# Patient Record
(demographics unavailable — no encounter records)

---

## 2018-05-17 NOTE — PCM.LDHP
L&D History of Present Illness





- General


Date of Service: 18


Admit Problem/Dx: 


 Patient Status Order with Admit Dx/Problem





18 00:34


Patient Status [ADT] Routine 








 Admission Diagnosis/Problem











Admission Diagnosis/Problem    Pregnancy-related examination














18 08:26


38 yo  EDC 2018 40 5/7wks A+, R-NI, GBS pos with PCN allergy. IOL for 

post dates


Source of Information: Patient


History Limitations: Reports: No Limitations





- History of Present Illness


Improves with: Reports: None


Worsens with: Reports: None


Associated Symptoms: Reports: N





- Related Data


Allergies/Adverse Reactions: 


 Allergies











Allergy/AdvReac Type Severity Reaction Status Date / Time


 


amoxicillin trihydrate Allergy  rashes Verified 16 15:39





[From Augmentin]     














Past Medical History





- Past Health History


Medical/Surgical History: Denies Medical/Surgical History





Social & Family History





- Family History


Family Medical History: Noncontributory





- Tobacco Use


Smoking Status *Q: Never Smoker


Second Hand Smoke Exposure: No





- Caffeine Use


Caffeine Use: Reports: Soda





- Recreational Drug Use


Recreational Drug Use: No





H&P Review of Systems





- Review of Systems:


Review Of Systems: See Below


General: Reports: No Symptoms


HEENT: Reports: No Symptoms


Pulmonary: Reports: No Symptoms


Cardiovascular: Reports: No Symptoms


Gastrointestinal: Reports: No Symptoms


Genitourinary: Reports: No Symptoms


Musculoskeletal: Reports: No Symptoms


Skin: Reports: No Symptoms


Psychiatric: Reports: No Symptoms


Neurological: Reports: No Symptoms


Hematologic/Lymphatic: Reports: No Symptoms


Immunologic: Reports: No Symptoms





L&D Exam





- Exam


Exam: See Below





- Vital Signs


Weight: 74.389 kg





- OB Specific


Fetal Heart Tones: Present


Fetal Heart Rate (FHR) Variability: Moderate (6-25 bmp)





- Pittman Score


Pittman Score Cervix Position: Posterior


Pittman Score Consistency: Soft


Pittman Score Effacement: 51-70%


Pittman Score Dilation: 1-2 cm


Pittman Score Infant's Station: -1 ,0


Pittman Score Total: 7





- Exam


General: Alert, Oriented, Cooperative


HEENT: Hearing Intact


Lungs: Normal Respiratory Effort


Rectal Exam: Deferred


Genitourinary: Cervical dilitation


Back Exam: Normal Inspection, Full Range of Motion


Extremities: Normal Inspection, Normal Range of Motion, Non-Tender, No Pedal 

Edema, Normal Capillary Refill


Skin: Warm, Dry, Intact


Neurological: Cranial Nerves Intact, Reflexes Equal Bilateral, Normal Gait, 

Normal Speech, Normal Tone


Psychiatric: Alert, Normal Affect, Normal Mood





- Patient Data


Lab Results Last 24 hrs: 


 Laboratory Results - last 24 hr











  18 Range/Units





  01:20 01:20 


 


WBC  9.33   (4.0-11.0)  K/uL


 


RBC  3.96 L   (4.30-5.90)  M/uL


 


Hgb  13.3   (12.0-16.0)  g/dL


 


Hct  37.6   (36.0-46.0)  %


 


MCV  94.9   (80.0-98.0)  fL


 


MCH  33.6 H   (27.0-32.0)  pg


 


MCHC  35.4   (31.0-37.0)  g/dL


 


RDW Std Deviation  41.2   (28.0-62.0)  fl


 


RDW Coeff of Morena  12   (11.0-15.0)  %


 


Plt Count  288   (150-400)  K/uL


 


MPV  11.00   (7.40-12.00)  fL


 


Blood Type   A POSITIVE  


 


Antibody Screen   NEGATIVE  











Result Diagrams: 


 18 01:20








- Problem List


(1) Supervision of normal IUP (intrauterine pregnancy) in primigravida


SNOMED Code(s): 16991794, 335846449, 693627002, 858683050


   ICD Code: Z34.00 - ENCNTR FOR SUPRVSN OF NORMAL FIRST PREGNANCY, UNSP 

TRIMESTER   Status: Acute   Priority: High   Current Visit: Yes   


Qualifiers: 


   Trimester: third trimester   Qualified Code(s): Z34.03 - Encounter for 

supervision of normal first pregnancy, third trimester   





(2) AMA (advanced maternal age) primigravida 35+


SNOMED Code(s): 55941128, 622180997


   ICD Code: O09.519 - SUPERVISION OF ELDERLY PRIMIGRAVIDA, UNSPECIFIED 

TRIMESTER   Status: Acute   Priority: High   Current Visit: Yes   


Qualifiers: 


   Trimester: third trimester   Qualified Code(s): O09.513 - Supervision of 

elderly primigravida, third trimester   


Problem List Initiated/Reviewed/Updated: Yes


Orders Last 24hrs: 


 Active Orders 24 hr











 Category Date Time Status


 


 Patient Status [ADT] Routine ADT  18 00:34 Active


 


 Bedrest Bathroom Privileges [RC] ASDIRECTED Care  18 00:34 Active


 


 Communication Order [RC] ASDIRECTED Care  18 00:34 Active


 


 Communication Order [RC] ASDIRECTED Care  18 00:34 Active


 


 Communication Order [RC] ASDIRECTED Care  18 00:34 Active


 


 Fetal Heart Tones [RC] CONTINUOUS Care  18 00:34 Active


 


 Fetal Non Stress Test [RC] PER UNIT ROUTINE Care  18 00:34 Active


 


 May Shower [RC] ASDIRECTED Care  18 00:34 Active


 


 Notify Provider [RC] PRN Care  18 00:34 Active


 


 Notify Provider [RC] PRN Care  18 00:34 Active


 


 Notify Provider [RC] PRN Care  18 00:34 Active


 


 Notify Provider [RC] STAT Care  18 00:34 Active


 


 Oxygen Therapy [RC] ASDIRECTED Care  18 00:34 Active


 


 Up ad Nikki [RC] ASDIRECTED Care  18 00:34 Active


 


 Vaginal Exam [RC] PRN Care  18 00:34 Active


 


 Vaginal Exam [RC] PRN Care  18 00:34 Active


 


 Vital Signs [RC] PER UNIT ROUTINE Care  18 00:34 Active


 


 Vital Signs [RC] PER UNIT ROUTINE Care  18 00:34 Active


 


 Regular Diet [DIET] Diet  18 Breakfast Active


 


 Carboprost Tromethamine [Hemabate DS] Med  18 00:34 Active





 250 mcg IM ASDIRECTED PRN   


 


 Lactated Ringers [Ringers, Lactated] 1,000 ml Med  18 00:45 Active





 IV ASDIRECTED   


 


 Lidocaine 1% [Xylocaine 1%] Med  18 00:34 Active





 50 ml INJECT .ONCE PRN   


 


 Methylergonovine [Methergine] Med  18 00:34 Active





 0.2 mg IM ASDIRECTED PRN   


 


 Misoprostol [Cytotec] Med  18 00:45 Active





 25 mcg PO .ONCE   


 


 Misoprostol [Cytotec] Med  18 00:34 Active





 25 mcg PO Q4H PRN   


 


 Misoprostol [Cytotec] Med  18 03:48 Active





 25 mcg VAG Q4H PRN   


 


 Nalbuphine [Nubain] Med  18 00:45 Active





 10 mg IVPUSH ASDIRECTED   


 


 Oxytocin/0.9 % Sodium Chloride [Oxytocin 30 Unit/500 ML Med  18 00:45 

Active





 -NS]   





 30 unit in 500 ml IV TITRATE   


 


 Oxytocin/0.9 % Sodium Chloride [Oxytocin 30 Unit/500 ML Med  18 00:45 

Active





 -NS]   





 30 unit in 500 ml IV TITRATE   


 


 Sodium Chloride 0.9% [Saline Flush] Med  18 00:34 Active





 10 ml FLUSH ASDIRECTED PRN   


 


 Sodium Chloride 0.9% [Saline Flush] Med  18 00:34 Active





 2.5 ml FLUSH ASDIRECTED PRN   


 


 Terbutaline [Brethine] Med  18 00:34 Active





 0.25 mg SUBCUT ASDIRECTED PRN   


 


 Tranexamic Acid [Cyklokapron] 1,000 mg Med  18 00:34 Active





 Sodium Chloride 0.9% [Normal Saline] 100 ml   





 IV ONETIME   


 


 Water For Irrigation,Sterile [Sterile Water for Med  18 00:34 Active





 Irrigation]   





 1,000 ml IRR ASDIRECTED PRN   


 


 Fetal Scalp Electrode [WOMSER] Per Unit Routine Oth  18 00:34 Ordered


 


 Medication Administration Instruction [OM.PC] Q3H Oth  18 00:45 Ordered


 


 Peripheral IV Insertion Adult [OM.PC] Routine Oth  18 00:34 Ordered


 


 Resuscitation Status Routine Resus Stat  18 00:34 Ordered








 Medication Orders





Carboprost Tromethamine (Hemabate Ds)  250 mcg IM ASDIRECTED PRN


   PRN Reason: Post Partum Hemorrhage


Lactated Ringer's (Ringers, Lactated)  1,000 mls @ 150 mls/hr IV ASDIRECTED SIA


Oxytocin/Sodium Chloride (Oxytocin 30 Unit/500 Ml-Ns)  30 unit in 500 mls @ 2 

mls/hr IV TITRATE SIA; Protocol


Oxytocin/Sodium Chloride (Oxytocin 30 Unit/500 Ml-Ns)  30 unit in 500 mls @ 999 

mls/hr IV TITRATE SIA


Tranexamic Acid 1,000 mg/ (Sodium Chloride)  110 mls @ 660 mls/hr IV ONETIME PRN


   PRN Reason: Bleeding


Lidocaine HCl (Xylocaine 1%)  50 ml INJECT .ONCE PRN


   PRN Reason: Laceration repair


Methylergonovine Maleate (Methergine)  0.2 mg IM ASDIRECTED PRN


   PRN Reason: Post Partum Hemorrhage


Misoprostol (Cytotec)  25 mcg PO .ONCE SIA


   Last Admin: 18 02:35  Dose: 25 mcg


Misoprostol (Cytotec)  25 mcg PO Q4H PRN


   PRN Reason: Cervical Ripening


   Last Admin: 18 06:52  Dose: 25 mcg


Misoprostol (Cytotec)  25 mcg VAG Q4H PRN


   PRN Reason: cervical ripening


   Last Admin: 18 06:52  Dose: 25 mcg


Nalbuphine HCl (Nubain)  10 mg IVPUSH ASDIRECTED SIA


   Stop: 18 00:46


Sodium Chloride (Saline Flush)  10 ml FLUSH ASDIRECTED PRN


   PRN Reason: Keep Vein Open


Sodium Chloride (Saline Flush)  2.5 ml FLUSH ASDIRECTED PRN


   PRN Reason: Keep Vein Open


Sterile Water (Sterile Water For Irrigation)  1,000 ml IRR ASDIRECTED PRN


   PRN Reason: delivery


Terbutaline Sulfate (Brethine)  0.25 mg SUBCUT ASDIRECTED PRN


   PRN Reason: Tacysystole








Assessment/Plan Comment:: 





IOL


A:38 yo  EDC 2018 40 5/7wks A+, R-NI, GBS pos with PCN allergy. IOL 

for post dates


P: Admit, Cytotec to pitocin, epidrual prn, anticipate . Dr Jiang updated.

## 2018-05-17 NOTE — PCM.DEL
L & D Note





- General Info


Date of Service: 18


Mother's Due Date: 18





- Delivery Note


Cervical Ripening Method: Misoprostil


Delivery Outcome: Livebirth


Infant Delivery Method: Spontaneous Vaginal Delivery-Single


Infant Delivery Mode: Spontaneous


Birth Presentation: Vertex


Nuchal Cord: None


Anesthesia Type: Epidural


Anesthetic: Lidocaine (Xylocaine) 1% Plain


Amniotic Fluid Description: Clear


Episiotomy Type: Midline


Laceration: 2nd Degree


Suture type: Vicryl


Suture size: 3-0


Placenta: Intact, Spontaneous


Cord: 3 Vessels


Estimated Blood Loss: 150


Resuscitation Needed: No


APGAR Score 1 min: 8


APGAR Score 5 min: 9


Second Stage Interventions: Reports: Pushing, Pulls Own Legs Back


Delivery Comments (Free Text/Narrative):: 





 of viable girl. Head delivered with good pushing, shoulders and body 

followed easily. Infant to mothers abdomen. Spont cry, RN at  for support. 

Delayed cord clamping. Pitocin to IVF. Cord clamped and cut by FOB. Cord blood 

collected. Placenta delivered grossly intact. Inspection noted 2nd deg alyssa lac 

that was repaired in the usual manor with a 3-0 noy. EBL 150cc, APGARS 8/9 Wt 

6lb 8oz. Mother and baby left in stable for recovery bonding well.





Induction Criteria





- Pittman Score


Pittman Score Dilation: 1-2 cm


Pittman Score Effacement: 60-70%


Pittman Score Infant's Station: -1 ,0


Pittman Score Consistency: Soft


Pittman Score Cervix Position: Posterior


Pittman Score Total: 7


Pittman Score Presenting Part: Reports: Cephalic





- Induction


Gestational Age >/= 39 wks: Yes


Medical Indication: 





post dates


Estimated Pelvis: Reports: Adequate


Reassuring Fetal Monitoring Strip: Yes


Absence of Tachy Systole: Yes





- General Info


Date of Service: 18


Admission Dx/Problem (Free Text): 


 Patient Status Order with Admit Dx/Problem





18 00:34


Patient Status [ADT] Routine 








 Admission Diagnosis/Problem











Admission Diagnosis/Problem    Pregnancy-related examination














18 08:26


36 yo  EDC 2018 40 5/7wks A+, R-NI, GBS pos with PCN allergy. IOL for 

post dates


Functional Status: Reports: Pain Controlled





- Review of Systems


General: Reports: No Symptoms


HEENT: Reports: No Symptoms


Pulmonary: Reports: No Symptoms


Cardiovascular: Reports: No Symptoms


Gastrointestinal: Reports: No Symptoms


Genitourinary: Reports: No Symptoms


Musculoskeletal: Reports: No Symptoms


Skin: Reports: No Symptoms


Neurological: Reports: No Symptoms


Psychiatric: Reports: No Symptoms





- Patient Data


Weight - Most Recent: 74.389 kg


Lab Results Last 24 Hours: 


 Laboratory Results - last 24 hr











  18 Range/Units





  01:20 01:20 


 


WBC  9.33   (4.0-11.0)  K/uL


 


RBC  3.96 L   (4.30-5.90)  M/uL


 


Hgb  13.3   (12.0-16.0)  g/dL


 


Hct  37.6   (36.0-46.0)  %


 


MCV  94.9   (80.0-98.0)  fL


 


MCH  33.6 H   (27.0-32.0)  pg


 


MCHC  35.4   (31.0-37.0)  g/dL


 


RDW Std Deviation  41.2   (28.0-62.0)  fl


 


RDW Coeff of Morena  12   (11.0-15.0)  %


 


Plt Count  288   (150-400)  K/uL


 


MPV  11.00   (7.40-12.00)  fL


 


Blood Type   A POSITIVE  


 


Antibody Screen   NEGATIVE  











Med Orders - Current: 


 Current Medications





Acetaminophen (Tylenol Extra Strength)  500 mg PO Q4H PRN


   PRN Reason: Pain


Acetaminophen (Tylenol Extra Strength)  1,000 mg PO Q4H PRN


   PRN Reason: Pain


Benzocaine/Menthol (Dermoplast Pain Relief 20%-0.5% Spray)  78 gm TOP 

ASDIRECTED PRN


   PRN Reason: Perineal Comfort Measure


Bisacodyl (Dulcolax)  10 mg RECTAL .ONCE PRN


   PRN Reason: Constipation





Discontinued Medications





Carboprost Tromethamine (Hemabate Ds)  250 mcg IM ASDIRECTED PRN


   PRN Reason: Post Partum Hemorrhage


Lactated Ringer's (Ringers, Lactated)  1,000 mls @ 150 mls/hr IV ASDIRECTED SIA


   Last Admin: 18 11:00 Dose:  150 mls/hr


Oxytocin/Sodium Chloride (Oxytocin 30 Unit/500 Ml-Ns)  30 unit in 500 mls @ 2 

mls/hr IV TITRATE SIA; Protocol


Oxytocin/Sodium Chloride (Oxytocin 30 Unit/500 Ml-Ns)  30 unit in 500 mls @ 999 

mls/hr IV TITRATE [facility]


   Last Admin: 18 13:26 Dose:  999 mls/hr


Tranexamic Acid 1,000 mg/ (Sodium Chloride)  110 mls @ 660 mls/hr IV ONETIME PRN


   PRN Reason: Bleeding


Clindamycin Phosphate 900 mg/ (Premix)  50 mls @ 100 mls/hr IV Q8H [facility]


   Last Admin: 18 11:18 Dose:  100 mls/hr


Fentanyl/Bupivacaine HCl (Fentanyl-Bupiv-Ns 2 Mcg/Ml-0.125%) Confirm 

Administered Dose 100 mls @ as directed EP .STK-MED ONE


   Stop: 18 11:35


Lidocaine HCl (Xylocaine 1%)  50 ml INJECT .ONCE PRN


   PRN Reason: Laceration repair


Methylergonovine Maleate (Methergine)  0.2 mg IM ASDIRECTED PRN


   PRN Reason: Post Partum Hemorrhage


Misoprostol (Cytotec)  25 mcg PO .ONCE SIA


   Last Admin: 18 02:35 Dose:  25 mcg


Misoprostol (Cytotec)  25 mcg PO Q4H PRN


   PRN Reason: Cervical Ripening


   Last Admin: 18 06:52 Dose:  25 mcg


Misoprostol (Cytotec)  25 mcg VAG ONETIME ONE


   Stop: 18 00:42


   Last Admin: 18 02:35 Dose:  25 mcg


Misoprostol (Cytotec)  25 mcg VAG Q4H PRN


   PRN Reason: cervical ripening


   Last Admin: 18 06:52 Dose:  25 mcg


Nalbuphine HCl (Nubain)  10 mg IVPUSH ASDIRECTED [facility]


   Stop: 18 00:46


Sodium Chloride (Saline Flush)  10 ml FLUSH ASDIRECTED PRN


   PRN Reason: Keep Vein Open


Sodium Chloride (Saline Flush)  2.5 ml FLUSH ASDIRECTED PRN


   PRN Reason: Keep Vein Open


Sterile Water (Sterile Water For Irrigation)  1,000 ml IRR ASDIRECTED PRN


   PRN Reason: delivery


Terbutaline Sulfate (Brethine)  0.25 mg SUBCUT ASDIRECTED PRN


   PRN Reason: Tacysystole











- Exam


General: Alert, Oriented, Cooperative, No Acute Distress


Lungs: Normal Respiratory Effort


GI/Abdominal Exam: Soft, Non-Tender


 (Female) Exam: Normal External Exam, Normal Bimanual Exam, Vaginal Bleeding


Back Exam: Normal Inspection, Full Range of Motion


Extremities: Normal Inspection, Normal Range of Motion, Non-Tender, No Pedal 

Edema, Normal Capillary Refill


Skin: Warm, Dry, Intact


Wound/Incisions: Healing Well


Neurological: No New Focal Deficit, Normal Speech, Normal Tone


Psy/Mental Status: Alert, Normal Affect, Normal Mood





- Problem List & Annotations


(1) Supervision of normal IUP (intrauterine pregnancy) in primigravida


SNOMED Code(s): 41719199, 666563479, 762680051, 585819463


   Code(s): Z34.00 - ENCNTR FOR SUPRVSN OF NORMAL FIRST PREGNANCY, UNSP 

TRIMESTER   Status: Acute   Priority: High   Current Visit: Yes   


Qualifiers: 


   Trimester: third trimester   Qualified Code(s): Z34.03 - Encounter for 

supervision of normal first pregnancy, third trimester   





(2) AMA (advanced maternal age) primigravida 35+


SNOMED Code(s): 54489054, 402868332


   Code(s): O09.519 - SUPERVISION OF ELDERLY PRIMIGRAVIDA, UNSPECIFIED 

TRIMESTER   Status: Acute   Priority: High   Current Visit: Yes   


Qualifiers: 


   Trimester: third trimester   Qualified Code(s): O09.513 - Supervision of 

elderly primigravida, third trimester   





(3)  (normal spontaneous vaginal delivery)


SNOMED Code(s): 19274201


   Code(s): O80 - ENCOUNTER FOR FULL-TERM UNCOMPLICATED DELIVERY   Status: 

Acute   Priority: High   Current Visit: Yes   





- Problem List Review


Problem List Initiated/Reviewed/Updated: Yes





- My Orders


Last 24 Hours: 


My Active Orders





/ 00:34


Bedrest Bathroom Privileges [RC] ASDIRECTED 


Communication Order [RC] ASDIRECTED 


Fetal Heart Tones [RC] CONTINUOUS 


Fetal Non Stress Test [RC] PER UNIT ROUTINE 


May Shower [RC] ASDIRECTED 


Notify Provider [RC] PRN 


Notify Provider [RC] STAT 


Oxygen Therapy [RC] ASDIRECTED 


Up ad Nikki [RC] ASDIRECTED 


Vaginal Exam [RC] PRN 


Vaginal Exam [RC] PRN 


Vital Signs [RC] PER UNIT ROUTINE 


Vital Signs [RC] PER UNIT ROUTINE 





18 14:09


Patient Status [ADT] Routine 


May Shower [RC] ASDIRECTED 


Up ad Nikki [RC] ASDIRECTED 


Vital Signs [RC] PER UNIT ROUTINE 


Acetaminophen [Tylenol Extra Strength]   1,000 mg PO Q4H PRN 


Acetaminophen [Tylenol Extra Strength]   500 mg PO Q4H PRN 


Benzocaine/Menthol [Dermoplast Pain Relief 20%-0.5% Spray]   78 gm TOP 

ASDIRECTED PRN 


Bisacodyl [Dulcolax]   10 mg RECTAL .ONCE PRN 


Docusate Sodium [Colace]   100 mg PO BID PRN 


Ibuprofen [Motrin]   400 mg PO Q4H PRN 


Ibuprofen [Motrin]   800 mg PO Q6H PRN 


Lanolin [Lansinoh HPA]   See Dose Instructions  TOP ASDIRECTED PRN 


Witch Hazel [Tucks]   1 pad TOP ASDIRECTED PRN 


oxyCODONE   5 mg PO Q2H PRN 


Assess Lochia [WOMSER] Per Unit Routine 


Assess Uterine Involution [WOMSER] Per Unit Routine 


Peripheral IV Discontinue [OM.PC] Routine 


Resuscitation Status Routine 





18 Lunch


Regular Diet [DIET] 














- Plan


Plan:: 





IOL


A:36 yo  EDC 2018 40 5/7wks A+, R-NI, GBS pos with PCN allergy. IOL 

for post dates


P: Admit, Cytotec to pitocin, epidrual prn, anticipate . Dr Jiang updated. 








A:  of viable female. APGARS 8/9 WT: 6lb 8oz, EBL 150cc, 2nd deg lac with 

repair. Mother and baby left in stable condition for recovery


P: Routine pp plan of care

## 2018-05-17 NOTE — PCM.PREANE
Preanesthetic Assessment





- Anesthesia/Transfusion/Family Hx


Anesthesia History: No Prior Anesthesia


Family History of Anesthesia Reaction: No


Transfusion History: No Prior Transfusion(s)





- Review of Systems


General: No Symptoms


Pulmonary: No Symptoms


Cardiovascular: No Symptoms


Gastrointestinal: No Symptoms


Neurological: No Symptoms


Other: Reports: None





- Physical Assessment


Height: 5 ft 4 in


Weight: 74.389 kg


ASA Class: 2


Mental Status: Alert & Oriented x3


Airway Class: Mallampati = 2


Dentition: Reports: Normal Dentition


Thyro-Mental Finger Breadths: 3


Mouth Opening Finger Breadths: 3


ROM/Head Extension: Full


Lungs: Clear to Auscultation, Normal Respiratory Effort


Cardiovascular: Regular Rate, Regular Rhythm





- Lab


Values: 





 Laboratory Last Values











WBC  9.33 K/uL (4.0-11.0)   18  01:20    


 


RBC  3.96 M/uL (4.30-5.90)  L  18  01:20    


 


Hgb  13.3 g/dL (12.0-16.0)   18  01:20    


 


Hct  37.6 % (36.0-46.0)   18  01:20    


 


MCV  94.9 fL (80.0-98.0)   18  01:20    


 


MCH  33.6 pg (27.0-32.0)  H  18  01:20    


 


MCHC  35.4 g/dL (31.0-37.0)   18  01:20    


 


RDW Std Deviation  41.2 fl (28.0-62.0)   18  01:20    


 


RDW Coeff of Morena  12 % (11.0-15.0)   18  01:20    


 


Plt Count  288 K/uL (150-400)   18  01:20    


 


MPV  11.00 fL (7.40-12.00)   18  01:20    


 


Blood Type  A POSITIVE   18  01:20    


 


Antibody Screen  NEGATIVE   18  01:20    














- Allergies


Allergies/Adverse Reactions: 


 Allergies











Allergy/AdvReac Type Severity Reaction Status Date / Time


 


amoxicillin trihydrate Allergy  rashes Verified 16 15:39





[From Augmentin]     














- Acknowledgements


Anesthesia Type Planned: Epidural


Pt an Appropriate Candidate for the Planned Anesthesia: Yes


Alternatives and Risks of Anesthesia Discussed w Pt/Guardian: Yes


Pt/Guardian Understands and Agrees with Anesthesia Plan: Yes





PreAnesthesia Questionnaire





- Past Health History


Medical/Surgical History: Denies Medical/Surgical History


HEENT History: Reports: None


Cardiovascular History: Reports: None


Respiratory History: Reports: None


Gastrointestinal History: Reports: GERD


Genitourinary History: Reports: None


OB/GYN History: Reports: Pregnancy


: 1


Para: 0


LMP (Approximate): Pregnant


Musculoskeletal History: Reports: None


Neurological History: Reports: None


Psychiatric History: Reports: None


Endocrine/Metabolic History: Reports: None


Hematologic History: Reports: None


Immunologic History: Reports: None


Oncologic (Cancer) History: Reports: None


Dermatologic History: Reports: None





- Infectious Disease History


Infectious Disease History: Reports: None





- SUBSTANCE USE


Smoking Status *Q: Never Smoker


Tobacco Use Within Last Twelve Months: No


Second Hand Smoke Exposure: No


Recreational Drug Use History: No





- CURRENT (IN HOUSE) MEDS


Current Meds: 





 Current Medications





Carboprost Tromethamine (Hemabate Ds)  250 mcg IM ASDIRECTED PRN


   PRN Reason: Post Partum Hemorrhage


Lactated Ringer's (Ringers, Lactated)  1,000 mls @ 150 mls/hr IV ASDIRECTED SIA


   Last Admin: 18 11:00 Dose:  150 mls/hr


Oxytocin/Sodium Chloride (Oxytocin 30 Unit/500 Ml-Ns)  30 unit in 500 mls @ 2 

mls/hr IV TITRATE SIA; Protocol


Oxytocin/Sodium Chloride (Oxytocin 30 Unit/500 Ml-Ns)  30 unit in 500 mls @ 999 

mls/hr IV TITRATE SIA


Tranexamic Acid 1,000 mg/ (Sodium Chloride)  110 mls @ 660 mls/hr IV ONETIME PRN


   PRN Reason: Bleeding


Clindamycin Phosphate 900 mg/ (Premix)  50 mls @ 100 mls/hr IV Q8H [facility]


   Last Admin: 18 11:18 Dose:  100 mls/hr


Lidocaine HCl (Xylocaine 1%)  50 ml INJECT .ONCE PRN


   PRN Reason: Laceration repair


Methylergonovine Maleate (Methergine)  0.2 mg IM ASDIRECTED PRN


   PRN Reason: Post Partum Hemorrhage


Misoprostol (Cytotec)  25 mcg PO .ONCE SIA


   Last Admin: 18 02:35 Dose:  25 mcg


Misoprostol (Cytotec)  25 mcg PO Q4H PRN


   PRN Reason: Cervical Ripening


   Last Admin: 18 06:52 Dose:  25 mcg


Misoprostol (Cytotec)  25 mcg VAG Q4H PRN


   PRN Reason: cervical ripening


   Last Admin: 18 06:52 Dose:  25 mcg


Nalbuphine HCl (Nubain)  10 mg IVPUSH ASDIRECTED SIA


   Stop: 18 00:46


Sodium Chloride (Saline Flush)  10 ml FLUSH ASDIRECTED PRN


   PRN Reason: Keep Vein Open


Sodium Chloride (Saline Flush)  2.5 ml FLUSH ASDIRECTED PRN


   PRN Reason: Keep Vein Open


Sterile Water (Sterile Water For Irrigation)  1,000 ml IRR ASDIRECTED PRN


   PRN Reason: delivery


Terbutaline Sulfate (Brethine)  0.25 mg SUBCUT ASDIRECTED PRN


   PRN Reason: Tacysystole





Discontinued Medications





Fentanyl/Bupivacaine HCl (Fentanyl-Bupiv-Ns 2 Mcg/Ml-0.125%) Confirm 

Administered Dose 100 mls @ as directed EP .STK-MED ONE


   Stop: 18 11:35


Misoprostol (Cytotec)  25 mcg VAG ONETIME ONE


   Stop: 18 00:42


   Last Admin: 18 02:35 Dose:  25 mcg

## 2018-05-18 NOTE — PCM.DCSUM1
**Discharge Summary





- Hospital Course


Free Text/Narrative:: 





Discharge home with infant. Follow up 6 weeks for post partum or sooner if 

needed.





- Discharge Data


Discharge Date: 18


Discharge Disposition: Home, Self-Care 01


Condition: Good





- Discharge Diagnosis/Problem(s)


(1) Supervision of normal IUP (intrauterine pregnancy) in primigravida


SNOMED Code(s): 08429540, 003552385, 412764631, 913660073


   ICD Code: Z34.00 - ENCNTR FOR SUPRVSN OF NORMAL FIRST PREGNANCY, UNSP 

TRIMESTER   Status: Acute   Priority: High   Current Visit: Yes   


Qualifiers: 


   Trimester: third trimester   Qualified Code(s): Z34.03 - Encounter for 

supervision of normal first pregnancy, third trimester   





(2) AMA (advanced maternal age) primigravida 35+


SNOMED Code(s): 17366143, 097195700


   ICD Code: O09.519 - SUPERVISION OF ELDERLY PRIMIGRAVIDA, UNSPECIFIED 

TRIMESTER   Status: Acute   Priority: High   Current Visit: Yes   


Qualifiers: 


   Trimester: third trimester   Qualified Code(s): O09.513 - Supervision of 

elderly primigravida, third trimester   





(3)  (normal spontaneous vaginal delivery)


SNOMED Code(s): 68979006


   ICD Code: O80 - ENCOUNTER FOR FULL-TERM UNCOMPLICATED DELIVERY   Status: 

Acute   Priority: High   Current Visit: Yes   





- Patient Instructions


Diet: Usual Diet as Tolerated


Activity: As Tolerated, No Strenuous Activities, Rest and Relax Today


Driving: May Drive Today


Showering/Bathing: May Shower


Notify Provider of: Fever, Increased Pain, Swelling and Redness, Nausea and/or 

Vomiting


Other/Special Instructions: Discharge home with infant. Follow up 6 weeks for 

post partum or sooner if needed.





- Discharge Plan


Referrals: 


[facility] [Outside]


Myesha Bates CNM [Primary Care Provider] - 18 10:45 am





- General Info


Date of Service: 18


Admission Dx/Problem (Free Text: 


 Patient Status Order with Admit Dx/Problem





18 00:34


Patient Status [ADT] Routine 








 Admission Diagnosis/Problem











Admission Diagnosis/Problem    Pregnancy-related examination














18 08:26


36 yo  EDC 2018 40 5/7wks A+, R-NI, GBS pos with PCN allergy. IOL for 

post dates


Functional Status: Reports: Pain Controlled, Tolerating Diet, Ambulating, 

Urinating





- Review of Systems


General: Reports: No Symptoms


HEENT: Reports: No Symptoms


Pulmonary: Reports: No Symptoms


Cardiovascular: Reports: No Symptoms


Gastrointestinal: Reports: No Symptoms


Genitourinary: Reports: No Symptoms


Musculoskeletal: Reports: No Symptoms


Skin: Reports: No Symptoms


Neurological: Reports: No Symptoms


Psychiatric: Reports: No Symptoms





- Patient Data


Vitals - Most Recent: 


 Last Vital Signs











Temp  36.9 C   18 04:05


 


Pulse  69   18 04:05


 


Resp  14   18 04:05


 


BP  103/57 L  18 04:05


 


Pulse Ox  98   18 04:05











Weight - Most Recent: 74.389 kg


Med Orders - Current: 


 Current Medications





Acetaminophen (Tylenol Extra Strength)  500 mg PO Q4H PRN


   PRN Reason: Pain


Acetaminophen (Tylenol Extra Strength)  1,000 mg PO Q4H PRN


   PRN Reason: Pain


Benzocaine/Menthol (Dermoplast Pain Relief 20%-0.5% Spray)  78 gm TOP 

ASDIRECTED PRN


   PRN Reason: Perineal Comfort Measure


   Last Admin: 18 14:48 Dose:  1 canister


Bisacodyl (Dulcolax)  10 mg RECTAL .ONCE PRN


   PRN Reason: Constipation


Docusate Sodium (Colace)  100 mg PO BID PRN


   PRN Reason: Constipation


   Last Admin: 18 14:47 Dose:  100 mg


Emollient Ointment (Lansinoh Hpa)  0 gm TOP ASDIRECTED PRN


   PRN Reason: Sore Nipples


Ibuprofen (Motrin)  400 mg PO Q4H PRN


   PRN Reason: Pain


Ibuprofen (Motrin)  800 mg PO Q6H PRN


   PRN Reason: Pain


   Last Admin: 18 05:28 Dose:  800 mg


Oxycodone HCl (Oxycodone)  5 mg PO Q2H PRN


   PRN Reason: Pain


Witch Hazel (Tucks)  1 pad TOP ASDIRECTED PRN


   PRN Reason: comfort care


   Last Admin: 18 14:47 Dose:  1 tub





Discontinued Medications





Carboprost Tromethamine (Hemabate Ds)  250 mcg IM ASDIRECTED PRN


   PRN Reason: Post Partum Hemorrhage


Lactated Ringer's (Ringers, Lactated)  1,000 mls @ 150 mls/hr IV ASDIRECTED [facility]


   Last Admin: 18 11:00 Dose:  150 mls/hr


Oxytocin/Sodium Chloride (Oxytocin 30 Unit/500 Ml-Ns)  30 unit in 500 mls @ 2 

mls/hr IV TITRATE [facility]; Protocol


Oxytocin/Sodium Chloride (Oxytocin 30 Unit/500 Ml-Ns)  30 unit in 500 mls @ 999 

mls/hr IV TITRATE [facility]


   Last Admin: 18 13:26 Dose:  999 mls/hr


Tranexamic Acid 1,000 mg/ (Sodium Chloride)  110 mls @ 660 mls/hr IV ONETIME PRN


   PRN Reason: Bleeding


Clindamycin Phosphate 900 mg/ (Premix)  50 mls @ 100 mls/hr IV Q8H [facility]


   Last Admin: 18 11:18 Dose:  100 mls/hr


Fentanyl/Bupivacaine HCl (Fentanyl-Bupiv-Ns 2 Mcg/Ml-0.125%) Confirm 

Administered Dose 100 mls @ as directed EP .STK-MED ONE


   Stop: 18 11:35


   Last Admin: 18 23:54 Dose:  Not Given


Lidocaine HCl (Xylocaine 1%)  50 ml INJECT .ONCE PRN


   PRN Reason: Laceration repair


   Last Admin: 18 13:20 Dose:  50 ml


Methylergonovine Maleate (Methergine)  0.2 mg IM ASDIRECTED PRN


   PRN Reason: Post Partum Hemorrhage


Misoprostol (Cytotec)  25 mcg PO .ONCE [facility]


   Last Admin: 18 02:35 Dose:  25 mcg


Misoprostol (Cytotec)  25 mcg PO Q4H PRN


   PRN Reason: Cervical Ripening


   Last Admin: 18 06:52 Dose:  25 mcg


Misoprostol (Cytotec)  25 mcg VAG ONETIME ONE


   Stop: 18 00:42


   Last Admin: 18 02:35 Dose:  25 mcg


Misoprostol (Cytotec)  25 mcg VAG Q4H PRN


   PRN Reason: cervical ripening


   Last Admin: 18 06:52 Dose:  25 mcg


Nalbuphine HCl (Nubain)  10 mg IVPUSH ASDIRECTED [facility]


   Stop: 18 00:46


Sodium Chloride (Saline Flush)  10 ml FLUSH ASDIRECTED PRN


   PRN Reason: Keep Vein Open


Sodium Chloride (Saline Flush)  2.5 ml FLUSH ASDIRECTED PRN


   PRN Reason: Keep Vein Open


Sterile Water (Sterile Water For Irrigation)  1,000 ml IRR ASDIRECTED PRN


   PRN Reason: delivery


Terbutaline Sulfate (Brethine)  0.25 mg SUBCUT ASDIRECTED PRN


   PRN Reason: Tacysystole











- Exam


General: Reports: Alert, Oriented, Cooperative, No Acute Distress


Lungs: Reports: Clear to Auscultation, Normal Respiratory Effort


Cardiovascular: Reports: Regular Rate, Regular Rhythm, No Murmurs


GI/Abdominal Exam: Normal Bowel Sounds, Soft, Non-Tender, No Organomegaly, No 

Distention, No Abnormal Bruit, No Mass, Pelvis Stable


 (Female) Exam: Vaginal Bleeding


Rectal (Female) Exam: Deferred


Back Exam: Reports: Normal Inspection, Full Range of Motion


Extremities: Normal Inspection, Normal Range of Motion, Non-Tender, No Pedal 

Edema, Normal Capillary Refill


Skin: Reports: Warm, Dry, Intact


Wound/Incisions: Reports: Healing Well


Neurological: Reports: No New Focal Deficit, Normal Gait, Normal Speech, Normal 

Tone


Psy/Mental Status: Reports: Alert, Normal Affect, Normal Mood

## 2018-05-19 NOTE — PCM.PNPP
- General Info


Date of Service: 18


Functional Status: Reports: Pain Controlled





- Review of Systems


General: Reports: No Symptoms


HEENT: Reports: No Symptoms


Pulmonary: Reports: No Symptoms


Cardiovascular: Reports: No Symptoms


Gastrointestinal: Reports: No Symptoms


Genitourinary: Reports: No Symptoms


Musculoskeletal: Reports: No Symptoms


Skin: Reports: No Symptoms


Neurological: Reports: No Symptoms


Psychiatric: Reports: No Symptoms





- General Info


Date of Service: 18





- Patient Data


Vital Signs - Most Recent: 


 Last Vital Signs











Temp  36.4 C   18 04:00


 


Pulse  68   18 04:00


 


Resp  15   18 04:00


 


BP  92/52 L  18 04:00


 


Pulse Ox  97   18 04:00











Weight - Most Recent: 74.389 kg


Med Orders - Current: 


 Current Medications





Acetaminophen (Tylenol Extra Strength)  500 mg PO Q4H PRN


   PRN Reason: Pain


Acetaminophen (Tylenol Extra Strength)  1,000 mg PO Q4H PRN


   PRN Reason: Pain


Benzocaine/Menthol (Dermoplast Pain Relief 20%-0.5% Spray)  78 gm TOP 

ASDIRECTED PRN


   PRN Reason: Perineal Comfort Measure


   Last Admin: 18 14:48 Dose:  1 canister


Bisacodyl (Dulcolax)  10 mg RECTAL .ONCE PRN


   PRN Reason: Constipation


Docusate Sodium (Colace)  100 mg PO BID PRN


   PRN Reason: Constipation


   Last Admin: 18 20:14 Dose:  100 mg


Emollient Ointment (Lansinoh Hpa)  0 gm TOP ASDIRECTED PRN


   PRN Reason: Sore Nipples


Ibuprofen (Motrin)  400 mg PO Q4H PRN


   PRN Reason: Pain


Ibuprofen (Motrin)  800 mg PO Q6H PRN


   PRN Reason: Pain


   Last Admin: 18 06:19 Dose:  800 mg


Oxycodone HCl (Oxycodone)  5 mg PO Q2H PRN


   PRN Reason: Pain


   Last Admin: 18 06:18 Dose:  5 mg


Witch Hazel (Tucks)  1 pad TOP ASDIRECTED PRN


   PRN Reason: comfort care


   Last Admin: 18 14:47 Dose:  1 tub





Discontinued Medications





Carboprost Tromethamine (Hemabate Ds)  250 mcg IM ASDIRECTED PRN


   PRN Reason: Post Partum Hemorrhage


Lactated Ringer's (Ringers, Lactated)  1,000 mls @ 150 mls/hr [facility]


   Last Admin: 18 11:00 Dose:  150 mls/hr


Oxytocin/Sodium Chloride (Oxytocin 30 Unit/500 Ml-Ns)  30 unit in 500 mls @ 2 

mls/hr IV TITRATE [facility]; Protocol


Oxytocin/Sodium Chloride (Oxytocin 30 Unit/500 Ml-Ns)  30 unit in 500 mls @ 999 

mls/hr IV [facility]ITRATE [facility]


   Last Admin: 18 13:26 Dose:  999 mls/hr


Tranexamic Acid 1,000 mg/ (Sodium Chloride)  110 mls @ 660 mls/hr IV ONETIME PRN


   PRN Reason: Bleeding


Clindamycin Phosphate 900 mg/ (Premix)  50 mls @ 100 mls/hr IV Q8H [facility]


   Last Admin: 18 11:18 Dose:  100 mls/hr


Fentanyl/Bupivacaine HCl (Fentanyl-Bupiv-Ns 2 Mcg/Ml-0.125%) Confirm 

Administered Dose 100 mls @ as directed EP .STK-MED ONE


   Stop: 18 11:35


   Last Admin: 18 23:54 Dose:  Not Given


Lidocaine HCl (Xylocaine 1%)  50 ml INJECT .ONCE PRN


   PRN Reason: Laceration repair


   Last Admin: 18 13:20 Dose:  50 ml


Methylergonovine Maleate (Methergine)  0.2 mg IM ASDIRECTED PRN


   PRN Reason: Post Partum Hemorrhage


Misoprostol (Cytotec)  25 mcg PO .ONCE [facility]


   Last Admin: 18 02:35 Dose:  25 mcg


Misoprostol (Cytotec)  25 mcg PO Q4H PRN


   PRN Reason: Cervical Ripening


   Last Admin: 18 06:52 Dose:  25 mcg


Misoprostol (Cytotec)  25 mcg VAG ONETIME ONE


   Stop: 18 00:42


   Last Admin: 18 02:35 Dose:  25 mcg


Misoprostol (Cytotec)  25 mcg VAG Q4H PRN


   PRN Reason: cervical ripening


   Last Admin: 18 06:52 Dose:  25 mcg


Nalbuphine HCl (Nubain)  10 mg IVPUSH [facility]RECTED [facility]


   Stop: 18 00:46


Sodium Chloride (Saline Flush)  10 ml FLUSH ASDIRECTED PRN


   PRN Reason: Keep Vein Open


Sodium Chloride (Saline Flush)  2.5 ml FLUSH ASDIRECTED PRN


   PRN Reason: Keep Vein Open


Sterile Water (Sterile Water For Irrigation)  1,000 ml IRR ASDIRECTED PRN


   PRN Reason: delivery


Terbutaline Sulfate (Brethine)  0.25 mg SUBCUT ASDIRECTED PRN


   PRN Reason: Tacysystole











- Infant Interaction


Support Person: Significant Other





- Postpartum Recovery Exam


Fundal Tone: Firm


Fundal Level: 2 Fingerbreadths Below Umbilicus


Fundal Placement: Midline


Lochia Amount: Scant


Lochia Color: Rubra/Red


Perineum Description: Other (see below)


Other Perinuem Description: 2nd degree laceration.


Episiotomy/Laceration: Approximated


Bladder Status: Voiding





- Exam


General: Alert, Oriented


HEENT: Pupils Equal


Neck: Supple


Lungs: Clear to Auscultation, Normal Respiratory Effort


Cardiovascular: Regular Rate, Regular Rhythm


GI/Abdominal Exam: Normal Bowel Sounds, Soft, Non-Tender, No Organomegaly, No 

Distention, No Abnormal Bruit, No Mass, Pelvis Stable


Extremities: Normal Inspection, Normal Range of Motion, Non-Tender, No Pedal 

Edema, Normal Capillary Refill


Skin: Warm, Dry, Intact


Wound/Incisions: Healing Well


Neurological: No New Focal Deficit


Psy/Mental Status: Alert, Normal Affect, Normal Mood





- Problem List Review


Problem List Initiated/Reviewed/Updated: Yes





- Assessment


Assessment:: 





send home today.





- Plan


Plan:: 





IOL


A:38 yo  EDC 2018 40 5/7wks A+, R-NI, GBS pos with PCN allergy. IOL 

for post dates


P: Admit, Cytotec to pitocin, epidrual prn, anticipate . Dr Jiang updated. 








A:  of viable female. APGARS 8/9 WT: 6lb 8oz, EBL 150cc, 2nd deg lac with 

repair. Mother and baby left in stable condition for recovery


P: Routine pp plan of care

## 2018-05-19 NOTE — PCM.DCSUM1
**Discharge Summary





- Discharge Data


Discharge Date: 05/19/18


Discharge Disposition: Home, Self-Care 01


Condition: Good





- Patient Instructions


Diet: Usual Diet as Tolerated


Activity: As Tolerated, No Strenuous Activities, Rest and Relax Today


Driving: May Drive Today


Showering/Bathing: May Shower


Notify Provider of: Fever, Increased Pain, Swelling and Redness, Nausea and/or 

Vomiting


Other/Special Instructions: Discharge home with infant. Follow up 6 weeks for 

post partum or sooner if needed.





- Discharge Plan


Referrals: 


[facility] [Outside]


Myesha Bates CNM [Primary Care Provider] - 06/29/18 10:45 am





- General Info


Date of Service: 05/19/18


Functional Status: Reports: Pain Controlled





- Review of Systems


General: Reports: No Symptoms


HEENT: Reports: No Symptoms


Pulmonary: Reports: No Symptoms


Cardiovascular: Reports: No Symptoms


Gastrointestinal: Reports: No Symptoms


Genitourinary: Reports: No Symptoms


Musculoskeletal: Reports: No Symptoms


Skin: Reports: No Symptoms


Neurological: Reports: No Symptoms


Psychiatric: Reports: No Symptoms





- Patient Data


Vitals - Most Recent: 


 Last Vital Signs











Temp  36.4 C   05/19/18 04:00


 


Pulse  68   05/19/18 04:00


 


Resp  15   05/19/18 04:00


 


BP  92/52 L  05/19/18 04:00


 


Pulse Ox  97   05/19/18 04:00











Weight - Most Recent: 74.389 kg


Med Orders - Current: 


 Current Medications





Acetaminophen (Tylenol Extra Strength)  500 mg PO Q4H PRN


   PRN Reason: Pain


Acetaminophen (Tylenol Extra Strength)  1,000 mg PO Q4H PRN


   PRN Reason: Pain


Benzocaine/Menthol (Dermoplast Pain Relief 20%-0.5% Spray)  78 gm TOP 

ASDIRECTED PRN


   PRN Reason: Perineal Comfort Measure


   Last Admin: 05/17/18 14:48 Dose:  1 canister


Bisacodyl (Dulcolax)  10 mg RECTAL .ONCE PRN


   PRN Reason: Constipation


Docusate Sodium (Colace)  100 mg PO BID PRN


   PRN Reason: Constipation


   Last Admin: 05/18/18 20:14 Dose:  100 mg


Emollient Ointment (Lansinoh Hpa)  0 gm TOP ASDIRECTED PRN


   PRN Reason: Sore Nipples


Ibuprofen (Motrin)  400 mg PO Q4H PRN


   PRN Reason: Pain


Ibuprofen (Motrin)  800 mg PO Q6H PRN


   PRN Reason: Pain


   Last Admin: 05/19/18 06:19 Dose:  800 mg


Oxycodone HCl (Oxycodone)  5 mg PO Q2H PRN


   PRN Reason: Pain


   Last Admin: 05/19/18 06:18 Dose:  5 mg


Witch Hazel (Tucks)  1 pad TOP ASDIRECTED PRN


   PRN Reason: comfort care


   Last Admin: 05/17/18 14:47 Dose:  1 tub





Discontinued Medications





Carboprost Tromethamine (Hemabate Ds)  250 mcg IM ASDIRECTED PRN


   PRN Reason: Post Partum Hemorrhage


Lactated Ringer's (Ringers, Lactated)  1,000 mls @ 150 mls/hr IV ASDIRECTED [facility]


   Last Admin: 05/17/18 11:00 Dose:  150 mls/hr


Oxytocin/Sodium Chloride (Oxytocin 30 Unit/500 Ml-Ns)  30 unit in 500 mls @ 2 

mls/hr IV TITRATE [facility]; Protocol


Oxytocin/Sodium Chloride (Oxytocin 30 Unit/500 Ml-Ns)  30 unit in 500 mls @ 999 

mls/hr IV TITRATE [facility]


   Last Admin: 05/17/18 13:26 Dose:  999 mls/hr


Tranexamic Acid 1,000 mg/ (Sodium Chloride)  110 mls @ 660 mls/hr IV ONETIME PRN


   PRN Reason: Bleeding


Clindamycin Phosphate 900 mg/ (Premix)  50 mls @ 100 mls/hr IV Q8H [facility]


   Last Admin: 05/17/18 11:18 Dose:  100 mls/hr


Fentanyl/Bupivacaine HCl (Fentanyl-Bupiv-Ns 2 Mcg/Ml-0.125%) Confirm 

Administered Dose 100 mls @ as directed EP .STK-MED ONE


   Stop: 05/17/18 11:35


   Last Admin: 05/17/18 23:54 Dose:  Not Given


Lidocaine HCl (Xylocaine 1%)  50 ml INJECT .ONCE PRN


   PRN Reason: Laceration repair


   Last Admin: 05/17/18 13:20 Dose:  50 ml


Methylergonovine Maleate (Methergine)  0.2 mg IM ASDIRECTED PRN


   PRN Reason: Post Partum Hemorrhage


Misoprostol (Cytotec)  25 mcg PO .ONCE [facility]


   Last Admin: 05/17/18 02:35 Dose:  25 mcg


Misoprostol (Cytotec)  25 mcg PO Q4H PRN


   PRN Reason: Cervical Ripening


   Last Admin: 05/17/18 06:52 Dose:  25 mcg


Misoprostol (Cytotec)  25 mcg VAG ONETIME ONE


   Stop: 05/17/18 00:42


   Last Admin: 05/17/18 02:35 Dose:  25 mcg


Misoprostol (Cytotec)  25 mcg VAG Q4H PRN


   PRN Reason: cervical ripening


   Last Admin: 05/17/18 06:52 Dose:  25 mcg


Nalbuphine HCl (Nubain)  10 mg IVPUSH ASDIRECTED [facility]


   Stop: 05/19/18 00:46


Sodium Chloride (Saline Flush)  10 ml FLUSH ASDIRECTED PRN


   PRN Reason: Keep Vein Open


Sodium Chloride (Saline Flush)  2.5 ml FLUSH ASDIRECTED PRN


   PRN Reason: Keep Vein Open


Sterile Water (Sterile Water For Irrigation)  1,000 ml IRR ASDIRECTED PRN


   PRN Reason: delivery


Terbutaline Sulfate (Brethine)  0.25 mg SUBCUT ASDIRECTED PRN


   PRN Reason: Tacysystole











- Exam


General: Reports: Alert, Oriented


HEENT: Reports: Pupils Equal, Pupils Reactive, EOMI, Mucous Membr. Moist/Pink


Neck: Reports: Supple


Lungs: Reports: Clear to Auscultation, Normal Respiratory Effort


Cardiovascular: Reports: Regular Rate, Regular Rhythm


GI/Abdominal Exam: Normal Bowel Sounds, Soft, Non-Tender, No Organomegaly, No 

Distention, No Abnormal Bruit, No Mass, Pelvis Stable


 (Female) Exam: Normal External Exam, Normal Speculum Exam, Normal Bimanual 

Exam


Rectal (Female) Exam: Normal Exam, Normal Rectal Tone


Back Exam: Reports: Normal Inspection, Full Range of Motion


Extremities: Normal Inspection, Normal Range of Motion, Non-Tender, No Pedal 

Edema, Normal Capillary Refill


Skin: Reports: Warm, Dry, Intact


Wound/Incisions: Reports: Healing Well


Neurological: Reports: No New Focal Deficit


Psy/Mental Status: Reports: Alert, Normal Affect, Normal Mood